# Patient Record
Sex: MALE | Race: ASIAN | NOT HISPANIC OR LATINO | URBAN - METROPOLITAN AREA
[De-identification: names, ages, dates, MRNs, and addresses within clinical notes are randomized per-mention and may not be internally consistent; named-entity substitution may affect disease eponyms.]

---

## 2018-06-14 ENCOUNTER — EMERGENCY (EMERGENCY)
Facility: HOSPITAL | Age: 28
LOS: 1 days | Discharge: HOME | End: 2018-06-14
Attending: EMERGENCY MEDICINE | Admitting: EMERGENCY MEDICINE
Payer: SELF-PAY

## 2018-06-14 VITALS
TEMPERATURE: 98 F | SYSTOLIC BLOOD PRESSURE: 150 MMHG | DIASTOLIC BLOOD PRESSURE: 100 MMHG | HEART RATE: 57 BPM | RESPIRATION RATE: 98 BRPM | OXYGEN SATURATION: 100 %

## 2018-06-14 VITALS — SYSTOLIC BLOOD PRESSURE: 140 MMHG | DIASTOLIC BLOOD PRESSURE: 90 MMHG

## 2018-06-14 PROCEDURE — 99283 EMERGENCY DEPT VISIT LOW MDM: CPT

## 2018-06-14 RX ORDER — IBUPROFEN 200 MG
1 TABLET ORAL
Qty: 16 | Refills: 0 | OUTPATIENT
Start: 2018-06-14 | End: 2018-06-17

## 2018-06-14 RX ORDER — CYCLOBENZAPRINE HYDROCHLORIDE 10 MG/1
1 TABLET, FILM COATED ORAL
Qty: 12 | Refills: 0 | OUTPATIENT
Start: 2018-06-14 | End: 2018-06-17

## 2018-06-14 NOTE — ED PROVIDER NOTE - EXTREMITY EXAM
5/5 strength in all four extremities 5/5 strength in all four extremities, 2+ distal pulses x 4 extremities

## 2018-06-14 NOTE — ED PROVIDER NOTE - OBJECTIVE STATEMENT
29 y/o M w/ no pertinent PMH presents to ED w/ c/o pain to entire L side x2 months. Pt reports to L eye pain, L arm pain, L leg pain.  Pt has trouble sleeping at night, and pain worsens with movement. Endorses use of Motrin for pain relief. Pt is currently ambulatory. Denies falling or recently lifting anything heavy, back pain, neck pain, HA, hematuria, blurred vision, abd pain, N/V/D or any other complaints. NKDA 27 y/o M w/ no pertinent PMH presents to ED w/ c/o left neck pain x2 months radiating to his arm. Pt has trouble sleeping at night, secondary to pain. Pt endorses left 4th and 5th digit numbness that occurs when he sleeps on that side as well. No weakness. No chest pain or SOB.  Endorses use of Motrin for pain relief. Pt is currently ambulatory. Denies falling or recently lifting anything heavy, NO back pain, HA, blurred vision, abd pain, N/V/D or any other complaints. NKDA 27 y/o M w/ no pertinent PMH presents to ED w/ c/o left neck pain x2 months radiating to his arm. Pt has trouble sleeping at night, secondary to pain. Pt endorses left 4th and 5th digit numbness that occurs when he sleeps on that side as well. No weakness. No chest pain or SOB.  Pt also endorses intermittent left sided hip pain. Pain radiates down his leg but no weakness or paresthesias. No associated back pain. Endorses use of Motrin for pain relief. Pt is currently ambulatory. Denies falling or recently lifting anything heavy, NO back pain, HA, blurred vision, abd pain, N/V/D or any other complaints. NKDA

## 2018-06-14 NOTE — ED PROVIDER NOTE - CARE PLAN
Principal Discharge DX:	Cervical radiculopathy Principal Discharge DX:	Cervical radiculopathy  Secondary Diagnosis:	Pain of left hip joint

## 2018-06-14 NOTE — ED PROVIDER NOTE - MEDICAL DECISION MAKING DETAILS
27 y/o M w/ likely cervical radiculopathy. Plan: Pt given referral information for clinic and likely outpatient MRI 27 y/o M w/ likely cervical radiculopathy. Plan: Pt given referral information for clinic and likely outpatient MRI. Pt ambulating without difficulty and no hip or leg TTP. will treat symptomatically and d/c for outpatient follow up.

## 2018-08-20 ENCOUNTER — EMERGENCY (EMERGENCY)
Facility: HOSPITAL | Age: 28
LOS: 1 days | Discharge: LEFT BEFORE DISCHARGE | End: 2018-08-20
Admitting: EMERGENCY MEDICINE

## 2023-06-06 NOTE — ED PROVIDER NOTE - CARDIAC, MLM
RX [place] ([place]) reviewed:  reviewed- no concerns    [place]:  [email or web address]    Refill done.  Annika Solomon MD on 6/6/2023 at 10:04 AM    Normal rate, regular rhythm.  Heart sounds S1, S2.  No murmurs, rubs or gallops.